# Patient Record
Sex: MALE | Race: WHITE | NOT HISPANIC OR LATINO | Employment: OTHER | URBAN - METROPOLITAN AREA
[De-identification: names, ages, dates, MRNs, and addresses within clinical notes are randomized per-mention and may not be internally consistent; named-entity substitution may affect disease eponyms.]

---

## 2017-08-25 ENCOUNTER — FOLLOW UP (OUTPATIENT)
Dept: URBAN - METROPOLITAN AREA CLINIC 33 | Facility: CLINIC | Age: 62
End: 2017-08-25

## 2017-08-25 VITALS — DIASTOLIC BLOOD PRESSURE: 82 MMHG | HEIGHT: 60 IN | SYSTOLIC BLOOD PRESSURE: 116 MMHG | HEART RATE: 80 BPM

## 2017-08-25 DIAGNOSIS — H33.001: ICD-10-CM

## 2017-08-25 DIAGNOSIS — H35.373: ICD-10-CM

## 2017-08-25 DIAGNOSIS — E11.9: ICD-10-CM

## 2017-08-25 DIAGNOSIS — H43.393: ICD-10-CM

## 2017-08-25 DIAGNOSIS — H33.302: ICD-10-CM

## 2017-08-25 DIAGNOSIS — H35.371: ICD-10-CM

## 2017-08-25 PROCEDURE — 92014 COMPRE OPH EXAM EST PT 1/>: CPT

## 2017-08-25 PROCEDURE — 92250 FUNDUS PHOTOGRAPHY W/I&R: CPT

## 2017-08-25 PROCEDURE — 92134 CPTRZ OPH DX IMG PST SGM RTA: CPT

## 2017-08-25 PROCEDURE — 92226 OPHTHALMOSCOPY (SUB): CPT

## 2017-08-25 ASSESSMENT — VISUAL ACUITY
OD_CC: 20/20-1
OS_CC: 20/20

## 2017-08-25 ASSESSMENT — TONOMETRY
OS_IOP_MMHG: 12
OD_IOP_MMHG: 9

## 2017-10-25 ENCOUNTER — IMPORTED ENCOUNTER (OUTPATIENT)
Dept: URBAN - METROPOLITAN AREA CLINIC 43 | Facility: CLINIC | Age: 62
End: 2017-10-25

## 2017-10-25 PROBLEM — H11.153: Noted: 2017-10-25

## 2017-10-25 PROBLEM — H25.013: Noted: 2017-10-25

## 2017-10-25 PROBLEM — E11.9: Noted: 2017-10-25

## 2017-10-25 PROBLEM — H40.033: Noted: 2017-10-25

## 2019-07-22 NOTE — PATIENT DISCUSSION
(H25.13) Age-related nuclear cataract, bilateral - Assesment : Examination revealed cataract. - Plan : Monitor for changes. Advised patient of condition of cataracts and discussed symptoms of cataracts worsening and becoming more bothersome. Pt to call if vision changes or becomes more bothered by visual symptoms before next appt.

## 2019-07-22 NOTE — PATIENT DISCUSSION
Monitor for changes. Explained condition and importance of monitoring and risk of angle closure. Symptoms of Angle Closure Attack explained to Pt in Icelandic.

## 2019-07-22 NOTE — PATIENT DISCUSSION
Pt to call with any Angle Closure Attack symptoms. [id number] [facility] [address] in 1 year for Gonio, Exam, and OCT ONH, sooner if problems or changes.

## 2019-07-22 NOTE — PATIENT DISCUSSION
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 Gonio performed today. Pt referred by O.D. for CARA jose.  Pt states was having some headaches, but were located on the back of the head. All information translated to Pt in Guatemalan by technician Cara Cannon [id number] : Monitor for changes. Explained condition and importance of monitoring and risk of angle closure. Symptoms of Angle Closure Attack explained to Pt in Guatemalan. Pt to call with any Angle Closure Attack symptoms. RTC in 1 year for Gonio, Exam, and OCT ONH, sooner if problems or changes.

## 2019-07-22 NOTE — PATIENT DISCUSSION
Monitor for IOP and NFL changes with visits and OCTs. Advised patient of condition and importance of monitoring for changes. RTC in 1 year for Gonio, Exam, and OCT ONH, sooner if problems or changes.

## 2019-07-22 NOTE — PATIENT DISCUSSION
Paz performed today. Pt referred by DINO for SIRIA jose.      Pt states was having some headaches, but were located on the back of the head.         All information translated to Pt in Romansh by technician Artvivienne Perez.

## 2020-02-10 ENCOUNTER — UNSCHEDULED FOLLOW UP (OUTPATIENT)
Dept: URBAN - METROPOLITAN AREA CLINIC 26 | Facility: CLINIC | Age: 65
End: 2020-02-10

## 2020-02-10 VITALS — HEIGHT: 62 IN | WEIGHT: 162 LBS | BODY MASS INDEX: 29.81 KG/M2

## 2020-02-10 DIAGNOSIS — H35.373: ICD-10-CM

## 2020-02-10 DIAGNOSIS — H43.393: ICD-10-CM

## 2020-02-10 DIAGNOSIS — H35.371: ICD-10-CM

## 2020-02-10 DIAGNOSIS — H33.312: ICD-10-CM

## 2020-02-10 DIAGNOSIS — H33.302: ICD-10-CM

## 2020-02-10 DIAGNOSIS — H33.001: ICD-10-CM

## 2020-02-10 DIAGNOSIS — E11.9: ICD-10-CM

## 2020-02-10 PROCEDURE — 92134 CPTRZ OPH DX IMG PST SGM RTA: CPT

## 2020-02-10 PROCEDURE — 92014 COMPRE OPH EXAM EST PT 1/>: CPT

## 2020-02-10 PROCEDURE — 67145 PROPH RTA DTCHMNT PC: CPT

## 2020-02-10 PROCEDURE — 92250 FUNDUS PHOTOGRAPHY W/I&R: CPT

## 2020-02-10 ASSESSMENT — VISUAL ACUITY
OS_CC: 20/15
OD_SC: 20/100-1
OD_CC: 20/20-2
OS_SC: 20/50+1
OD_PH: 20/30-1
OS_PH: 20/20-2

## 2020-02-10 ASSESSMENT — TONOMETRY
OS_IOP_MMHG: 11
OD_IOP_MMHG: 12

## 2020-03-04 ENCOUNTER — FOLLOW UP (OUTPATIENT)
Dept: URBAN - METROPOLITAN AREA CLINIC 26 | Facility: CLINIC | Age: 65
End: 2020-03-04

## 2020-03-04 DIAGNOSIS — H04.123: ICD-10-CM

## 2020-03-04 DIAGNOSIS — H33.312: ICD-10-CM

## 2020-03-04 DIAGNOSIS — H33.001: ICD-10-CM

## 2020-03-04 DIAGNOSIS — H35.371: ICD-10-CM

## 2020-03-04 DIAGNOSIS — H33.302: ICD-10-CM

## 2020-03-04 DIAGNOSIS — H43.393: ICD-10-CM

## 2020-03-04 DIAGNOSIS — H02.836: ICD-10-CM

## 2020-03-04 DIAGNOSIS — H35.373: ICD-10-CM

## 2020-03-04 DIAGNOSIS — H02.833: ICD-10-CM

## 2020-03-04 DIAGNOSIS — E11.9: ICD-10-CM

## 2020-03-04 DIAGNOSIS — H25.13: ICD-10-CM

## 2020-03-04 PROCEDURE — 99024 POSTOP FOLLOW-UP VISIT: CPT

## 2020-03-04 PROCEDURE — 92250 FUNDUS PHOTOGRAPHY W/I&R: CPT

## 2020-03-04 ASSESSMENT — TONOMETRY
OS_IOP_MMHG: 12
OD_IOP_MMHG: 11

## 2020-03-04 ASSESSMENT — VISUAL ACUITY
OD_CC: 20/30+1
OS_CC: 20/20+1

## 2020-04-19 ASSESSMENT — VISUAL ACUITY
OS_SC: J20/400
OD_CC: 20/25+
OS_CC: 20/20-
OS_OTHER: 20/50.
OD_SC: J20/400
OD_OTHER: 20/50.
OD_CC: J1
OS_SC: 20/70+
OD_CC: J1+ @13"
OD_SC: 20/70-
OS_CC: J1+ @13"
OS_CC: J1

## 2020-04-19 ASSESSMENT — TONOMETRY
OD_IOP_MMHG: 10.0
OS_IOP_MMHG: 11.0

## 2022-07-30 ENCOUNTER — TELEPHONE ENCOUNTER (OUTPATIENT)
Age: 67
End: 2022-07-30

## 2022-07-31 ENCOUNTER — TELEPHONE ENCOUNTER (OUTPATIENT)
Age: 67
End: 2022-07-31